# Patient Record
Sex: FEMALE | Race: WHITE | NOT HISPANIC OR LATINO | Employment: UNEMPLOYED | URBAN - METROPOLITAN AREA
[De-identification: names, ages, dates, MRNs, and addresses within clinical notes are randomized per-mention and may not be internally consistent; named-entity substitution may affect disease eponyms.]

---

## 2018-01-30 ENCOUNTER — OFFICE VISIT (OUTPATIENT)
Dept: OPHTHALMOLOGY | Facility: CLINIC | Age: 68
End: 2018-01-30
Payer: MEDICARE

## 2018-01-30 DIAGNOSIS — Z83.511 FAMILY HISTORY OF GLAUCOMA: ICD-10-CM

## 2018-01-30 DIAGNOSIS — H40.003 GLAUCOMA SUSPECT OF BOTH EYES: ICD-10-CM

## 2018-01-30 DIAGNOSIS — H57.13 PAIN OF BOTH EYES: ICD-10-CM

## 2018-01-30 DIAGNOSIS — H04.129 DRY EYE: Primary | ICD-10-CM

## 2018-01-30 PROBLEM — R76.8 HELICOBACTER PYLORI ANTIBODY POSITIVE: Status: ACTIVE | Noted: 2017-09-26

## 2018-01-30 PROCEDURE — 92014 COMPRE OPH EXAM EST PT 1/>: CPT | Mod: S$PBB,,, | Performed by: OPHTHALMOLOGY

## 2018-01-30 PROCEDURE — 99999 PR PBB SHADOW E&M-EST. PATIENT-LVL II: CPT | Mod: PBBFAC,,, | Performed by: OPHTHALMOLOGY

## 2018-01-30 PROCEDURE — 99212 OFFICE O/P EST SF 10 MIN: CPT | Mod: PBBFAC,PO | Performed by: OPHTHALMOLOGY

## 2018-01-30 RX ORDER — PREDNISOLONE ACETATE 10 MG/ML
1 SUSPENSION/ DROPS OPHTHALMIC 3 TIMES DAILY PRN
Qty: 5 ML | Refills: 0 | Status: SHIPPED | OUTPATIENT
Start: 2018-01-30 | End: 2018-03-06

## 2018-01-30 NOTE — PROGRESS NOTES
l    ===============================  01/30/2018   Maricruz Sawant,   67 y.o. female   Last visit [facility]: :8/15/2016   Last visit eye dept. Visit date not found  [facility]:  Corrected distance visual acuity was 20/20 in the right eye and 20/20 in the left eye.  Tonometry     Tonometry (Applanation, 2:42 PM)       Right Left    Pressure 14 14               Not recorded         Not recorded        Chief Complaint   Patient presents with    Dry Eye     yearly exam/  pt states that her left eye hurts off/on    Eye Pain os        HPI     Dry Eye    Additional comments: yearly exam/  pt states that her left eye hurts   off/on           Comments   PIgmentary Dispersion  VRTX  High Myope  H/O PVD OS  Dad with COAG  No DM  __________________________  12/29/2014  1. Dry eye, bilateral    2. Family history of glaucoma    3. Dry eye, stable  4. Lid fasciculation, ok, expect resolution, if continues and is   bothersome, see Dr. Birmingham  rtc 1 year       Last edited by Matilde Briscoe on 1/30/2018  2:35 PM. (History)          ________________  1/30/2018  Problem List Items Addressed This Visit     Dry eye - Primary    Eye pain    Family history of glaucoma      Other Visit Diagnoses     Glaucoma suspect of both eyes              .Patient deferred recommended dilation    Dry eye, rx for prednisolone, no refills for 1 year  Glaucoma suspect, + family history, asymmetric c:d  Good IOP  Gonio today angle wide open, 3+ pigment in angles  goct today OD rnfl borderline/normal, os low  Normal posterior pole  + family history    Recommend rtc for vf, cct, and Dr. Birmingham (ep with cpg)  Rule out pigmentary dispersion   See me prn    Patient request appt. With Dr. Adams         ===========================

## 2018-03-06 ENCOUNTER — OFFICE VISIT (OUTPATIENT)
Dept: OPHTHALMOLOGY | Facility: CLINIC | Age: 68
End: 2018-03-06
Payer: MEDICARE

## 2018-03-06 ENCOUNTER — APPOINTMENT (OUTPATIENT)
Dept: OPHTHALMOLOGY | Facility: CLINIC | Age: 68
End: 2018-03-06
Payer: MEDICARE

## 2018-03-06 DIAGNOSIS — H40.003 GLAUCOMA SUSPECT OF BOTH EYES: Primary | ICD-10-CM

## 2018-03-06 DIAGNOSIS — Z83.511 FAMILY HISTORY OF GLAUCOMA: ICD-10-CM

## 2018-03-06 DIAGNOSIS — H21.232 PIGMENT DISPERSION SYNDROME OF LEFT EYE: ICD-10-CM

## 2018-03-06 PROCEDURE — 76514 ECHO EXAM OF EYE THICKNESS: CPT | Mod: 26,S$PBB,, | Performed by: OPHTHALMOLOGY

## 2018-03-06 PROCEDURE — 92020 GONIOSCOPY: CPT | Mod: PBBFAC,PO | Performed by: OPHTHALMOLOGY

## 2018-03-06 PROCEDURE — 92020 GONIOSCOPY: CPT | Mod: S$PBB,,, | Performed by: OPHTHALMOLOGY

## 2018-03-06 PROCEDURE — 92012 INTRM OPH EXAM EST PATIENT: CPT | Mod: S$PBB,,, | Performed by: OPHTHALMOLOGY

## 2018-03-06 PROCEDURE — 99999 PR PBB SHADOW E&M-EST. PATIENT-LVL II: CPT | Mod: PBBFAC,,, | Performed by: OPHTHALMOLOGY

## 2018-03-06 PROCEDURE — 76514 ECHO EXAM OF EYE THICKNESS: CPT | Mod: PBBFAC,PO | Performed by: OPHTHALMOLOGY

## 2018-03-06 PROCEDURE — 99212 OFFICE O/P EST SF 10 MIN: CPT | Mod: PBBFAC,PO | Performed by: OPHTHALMOLOGY

## 2018-03-06 PROCEDURE — 92083 EXTENDED VISUAL FIELD XM: CPT | Mod: PBBFAC,PO | Performed by: OPHTHALMOLOGY

## 2018-03-06 NOTE — PROGRESS NOTES
HPI     Here for HVF review.  Referred by Dr. Hinkle to evaluate for pigmentary   glaucoma.  Wishes to defer dilation and any other tests she just had with   Dr. Hinkle.    PIgmentary Dispersion  VRTX  High Myope  H/O PVD OS  Dad with COAG  No DM  __________________________  12/29/2014  1. Dry eye, bilateral    2. Family history of glaucoma    3. Dry eye, stable  4. Lid fasciculation, ok, expect resolution, if continues and is   bothersome, see Dr. Birmingham  rtc 1 year    Last edited by Janee Peoples on 3/6/2018  3:37 PM. (History)            Assessment /Plan     For exam results, see Encounter Report.      ICD-10-CM ICD-9-CM    1. Glaucoma suspect of both eyes H40.003 365.00 Burden Visual Field - OU - Extended - Both Eyes    Glaucoma risk level is unchanged at this time and patient will continued to be followed.      2. Family history of glaucoma Z83.511 V19.11    3. Pigment dispersion syndrome of left eye H21.232 364.53 Follow closely.        Return to clinic 3 months with dilation and SDP's.

## 2019-06-07 ENCOUNTER — TELEPHONE (OUTPATIENT)
Dept: OPHTHALMOLOGY | Facility: CLINIC | Age: 69
End: 2019-06-07

## 2019-06-07 NOTE — TELEPHONE ENCOUNTER
----- Message from Desiree Scmhitt sent at 6/7/2019 12:14 PM CDT -----  Contact: pt  Type:  Patient Returning Call    Who Called: the pt  Who Left Message for Patient: unknown  Does the patient know what this is regarding?: no  Would the patient rather a call back or a response via [employer] [facility]? Call back  Best Call Back Number: [phone]  Additional Information: n/a

## 2019-06-07 NOTE — TELEPHONE ENCOUNTER
Ret call at 1:35, pt not there left mess with  that I returned call. He didn't know why she called.

## 2019-06-24 ENCOUNTER — OFFICE VISIT (OUTPATIENT)
Dept: OPHTHALMOLOGY | Facility: CLINIC | Age: 69
End: 2019-06-24
Payer: MEDICARE

## 2019-06-24 DIAGNOSIS — H04.129 DRY EYE: Primary | ICD-10-CM

## 2019-06-24 PROCEDURE — 99999 PR PBB SHADOW E&M-EST. PATIENT-LVL II: CPT | Mod: PBBFAC,,, | Performed by: OPHTHALMOLOGY

## 2019-06-24 PROCEDURE — 99999 PR PBB SHADOW E&M-EST. PATIENT-LVL II: ICD-10-PCS | Mod: PBBFAC,,, | Performed by: OPHTHALMOLOGY

## 2019-06-24 PROCEDURE — 99212 OFFICE O/P EST SF 10 MIN: CPT | Mod: PBBFAC | Performed by: OPHTHALMOLOGY

## 2019-06-24 PROCEDURE — 92014 PR EYE EXAM, EST PATIENT,COMPREHESV: ICD-10-PCS | Mod: S$PBB,,, | Performed by: OPHTHALMOLOGY

## 2019-06-24 PROCEDURE — 92014 COMPRE OPH EXAM EST PT 1/>: CPT | Mod: S$PBB,,, | Performed by: OPHTHALMOLOGY

## 2019-06-24 RX ORDER — PREDNISOLONE SODIUM PHOSPHATE 10 MG/ML
1 SOLUTION/ DROPS OPHTHALMIC 2 TIMES DAILY
Qty: 5 ML | Refills: 0 | Status: SHIPPED | OUTPATIENT
Start: 2019-06-24 | End: 2019-07-08

## 2019-06-24 NOTE — PROGRESS NOTES
===============================  06/24/2019   Maricruz Sawant,   68 y.o. female   Last visit [facility]: :1/30/2018   Last visit eye dept. Visit date not found  [facility]:  Corrected distance visual acuity was 20/25 -2 in the right eye and 20/30 +2 in the left eye.  Tonometry     Tonometry (Applanation, 2:59 PM)       Right Left    Pressure 17 17               Not recorded         Not recorded        Chief Complaint   Patient presents with    Eye Pain     pt states both eyes have been hurting for the past month. states the lids and and the eye itself hurt. the lids feel swollen. was using Systane that helped but it's not working anymore. also tried blink tears and another brand but did not get any relief.      Ophthalmic Medications     Ophthalmic - Anti-inflammatory, Glucocorticoids Start End     prednisoLONE sodium phosphate (INFLAMASE FORTE) 1 % Drop    6/24/2019 7/8/2019    Sig: Place 1 drop into both eyes 2 (two) times daily. for 14 days    Route: Both Eyes         HPI     Eye Pain      Additional comments: pt states both eyes have been hurting for the past   month. states the lids and and the eye itself hurt. the lids feel swollen.   was using Systane that helped but it's not working anymore. also tried   blink tears and another brand but did not get any relief.               Comments     PIgmentary Dispersion  VRTX  High Myope  H/O PVD OS  Dad with COAG  No DM  __________________________  12/29/2014  1. Dry eye, bilateral    2. Family history of glaucoma    3. Dry eye, stable  4. Lid fasciculation, ok, expect resolution, if continues and is   bothersome, see Dr. Birmingham  rtc 1 year          Last edited by Jose L Javier on 6/24/2019  2:20 PM. (History)          ________________  6/24/2019  Problem List Items Addressed This Visit     Dry eye - Primary    Relevant Medications    prednisoLONE sodium phosphate (INFLAMASE FORTE) 1 % Drop      lid swelling oswaldo in AM   I think this is rpeioculae vesous ccongestion    pf  bid x 2 weeks   call inb    .       ===========================

## 2019-07-31 ENCOUNTER — TELEPHONE (OUTPATIENT)
Dept: OPHTHALMOLOGY | Facility: CLINIC | Age: 69
End: 2019-07-31

## 2019-08-01 ENCOUNTER — TELEPHONE (OUTPATIENT)
Dept: OPHTHALMOLOGY | Facility: CLINIC | Age: 69
End: 2019-08-01

## 2019-08-01 DIAGNOSIS — H04.129 DRY EYE: Primary | ICD-10-CM

## 2019-08-01 RX ORDER — KETOROLAC TROMETHAMINE 5 MG/ML
1 SOLUTION OPHTHALMIC 2 TIMES DAILY PRN
Qty: 10 ML | Refills: 2 | Status: SHIPPED | OUTPATIENT
Start: 2019-08-01 | End: 2021-01-04

## 2019-08-01 NOTE — TELEPHONE ENCOUNTER
Maricruz called stating she tried the Prednisolone drops Dr. Hinkle prescribed to her but she did not see any improvement. She would like to know if another non steroidal drop will help and if so, can we send that to her pharmacy. She's still having swollen painful lids, dry eyes, no discharge. Dr. Hinkle reviewed her chart and suggested she try OTC Clear Eyes Pure Relief 3-4 times a day along with cold compresses. If she is not better after a couple weeks, she can call to come in to evaluate.     ----- Message from Ruiz Curiel sent at 8/1/2019  1:40 PM CDT -----  Contact: pt  Pt is calling regarding a change of medication. Pt is requesting to change to a new medication that's non-steroid instead of the medication that was given to pt.  Pt call back [phone]    [id number] - [address] - [address] [place] [address]  Phone: [phone] Fax: [phone]      Thanks

## 2019-08-01 NOTE — TELEPHONE ENCOUNTER
I spoke with Maricruz regarding adding the Clear eyes drops to her routine. She tried using them but did not get any relief. Dr. Hinkle suggests she use Acular twice a day and see if that will help. The prescription has been called in to the pharmacy and the patient notified.

## 2021-01-04 ENCOUNTER — OFFICE VISIT (OUTPATIENT)
Dept: OPHTHALMOLOGY | Facility: CLINIC | Age: 71
End: 2021-01-04
Payer: MEDICARE

## 2021-01-04 DIAGNOSIS — H26.012: Primary | ICD-10-CM

## 2021-01-04 PROCEDURE — 99999 PR PBB SHADOW E&M-EST. PATIENT-LVL II: CPT | Mod: PBBFAC,,, | Performed by: OPHTHALMOLOGY

## 2021-01-04 PROCEDURE — 92014 PR EYE EXAM, EST PATIENT,COMPREHESV: ICD-10-PCS | Mod: S$PBB,,, | Performed by: OPHTHALMOLOGY

## 2021-01-04 PROCEDURE — 99212 OFFICE O/P EST SF 10 MIN: CPT | Mod: PBBFAC | Performed by: OPHTHALMOLOGY

## 2021-01-04 PROCEDURE — 99999 PR PBB SHADOW E&M-EST. PATIENT-LVL II: ICD-10-PCS | Mod: PBBFAC,,, | Performed by: OPHTHALMOLOGY

## 2021-01-04 PROCEDURE — 92014 COMPRE OPH EXAM EST PT 1/>: CPT | Mod: S$PBB,,, | Performed by: OPHTHALMOLOGY

## 2021-02-23 ENCOUNTER — TELEPHONE (OUTPATIENT)
Dept: OPHTHALMOLOGY | Facility: CLINIC | Age: 71
End: 2021-02-23

## 2022-02-08 ENCOUNTER — OFFICE VISIT (OUTPATIENT)
Dept: OPHTHALMOLOGY | Facility: CLINIC | Age: 72
End: 2022-02-08
Payer: MEDICARE

## 2022-02-08 DIAGNOSIS — H26.011: ICD-10-CM

## 2022-02-08 DIAGNOSIS — Z83.511 FAMILY HISTORY OF GLAUCOMA: ICD-10-CM

## 2022-02-08 DIAGNOSIS — H40.023 OPEN ANGLE WITH BORDERLINE FINDINGS AND HIGH GLAUCOMA RISK IN BOTH EYES: Primary | ICD-10-CM

## 2022-02-08 DIAGNOSIS — H26.012: ICD-10-CM

## 2022-02-08 PROCEDURE — 99213 PR OFFICE/OUTPT VISIT, EST, LEVL III, 20-29 MIN: ICD-10-PCS | Mod: S$PBB,,, | Performed by: OPHTHALMOLOGY

## 2022-02-08 PROCEDURE — 92133 POSTERIOR SEGMENT OCT OPTIC NERVE(OCULAR COHERENCE TOMOGRAPHY) - OU - BOTH EYES: ICD-10-PCS | Mod: 26,S$PBB,, | Performed by: OPHTHALMOLOGY

## 2022-02-08 PROCEDURE — 99999 PR PBB SHADOW E&M-EST. PATIENT-LVL II: CPT | Mod: PBBFAC,,, | Performed by: OPHTHALMOLOGY

## 2022-02-08 PROCEDURE — 92133 CPTRZD OPH DX IMG PST SGM ON: CPT | Mod: PBBFAC | Performed by: OPHTHALMOLOGY

## 2022-02-08 PROCEDURE — 99213 OFFICE O/P EST LOW 20 MIN: CPT | Mod: S$PBB,,, | Performed by: OPHTHALMOLOGY

## 2022-02-08 PROCEDURE — 99999 PR PBB SHADOW E&M-EST. PATIENT-LVL II: ICD-10-PCS | Mod: PBBFAC,,, | Performed by: OPHTHALMOLOGY

## 2022-02-08 PROCEDURE — 99212 OFFICE O/P EST SF 10 MIN: CPT | Mod: PBBFAC | Performed by: OPHTHALMOLOGY

## 2022-02-08 RX ORDER — LATANOPROST 50 UG/ML
1 SOLUTION/ DROPS OPHTHALMIC
COMMUNITY
Start: 2022-01-14 | End: 2022-07-13

## 2022-02-08 NOTE — PROGRESS NOTES
===============================  Maricruz Sawant,  2/8/2022 today   71 y.o. female   Last visit [facility]: :1/4/2021   Last visit eye dept. Visit date not found  [facility]:  Corrected distance visual acuity was 20/30 in the right eye and 20/50 in the left eye.  Tonometry     Tonometry (Applanation, 2:40 PM)       Right Left    Pressure 12 12              Wearing Rx     Wearing Rx       Sphere Cylinder Axis    Right -5.50 +1.00 090    Left -7.25 +2.50 060    Type: SVL              Not recorded       Not recorded       Chief Complaint   Patient presents with    Dry Eye     Pt states that she is here today for a 2nd opinion.  She was recently told this but not quite sure.  She has been given drops to use but not using them yet.  Latanoprost     Ophthalmic Medications     Ophthalmic-Intraocular Pressure Reducing Agents, Prostaglandin Analogs Start End     latanoprost 0.005 % ophthalmic solution    1/14/2022 7/13/2022    Sig: Apply 1 drop to eye.    Class: Historical Med    Route: Ophthalmic        ________________  2/8/2022 today  HPI     Dry Eye      Additional comments: Pt states that she is here today for a 2nd opinion.    She was recently told this but not quite sure.  She has been given drops   to use but not using them yet.  Latanoprost              Comments     PIgmentary Dispersion  VRTX  High Myope  H/O PVD OS  Dad with COAG  No DM  __________________________  12/29/2014  1. Dry eye, bilateral    2. Family history of glaucoma    3. Dry eye, stable  4. Lid fasciculation, ok, expect resolution, if continues and is   bothersome, see Dr. Birmingham  rtc 1 year          Last edited by Matilde Briscoe on 2/8/2022  2:31 PM. 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 Check OU daily.   Gave my office and cell phone number.        ===========================

## 2022-04-27 ENCOUNTER — OFFICE VISIT (OUTPATIENT)
Dept: OPHTHALMOLOGY | Facility: CLINIC | Age: 72
End: 2022-04-27
Payer: MEDICARE

## 2022-04-27 DIAGNOSIS — H21.233 PIGMENT DISPERSION SYNDROME OF BOTH EYES: Primary | ICD-10-CM

## 2022-04-27 DIAGNOSIS — H26.012: ICD-10-CM

## 2022-04-27 DIAGNOSIS — H26.011: ICD-10-CM

## 2022-04-27 DIAGNOSIS — Z83.511 FAMILY HISTORY OF GLAUCOMA: ICD-10-CM

## 2022-04-27 DIAGNOSIS — H40.023 OPEN ANGLE WITH BORDERLINE FINDINGS AND HIGH GLAUCOMA RISK IN BOTH EYES: ICD-10-CM

## 2022-04-27 PROCEDURE — 99214 OFFICE O/P EST MOD 30 MIN: CPT | Mod: S$PBB,,, | Performed by: OPHTHALMOLOGY

## 2022-04-27 PROCEDURE — 92083 EXTENDED VISUAL FIELD XM: CPT | Mod: PBBFAC | Performed by: OPHTHALMOLOGY

## 2022-04-27 PROCEDURE — 99211 OFF/OP EST MAY X REQ PHY/QHP: CPT | Mod: PBBFAC | Performed by: OPHTHALMOLOGY

## 2022-04-27 PROCEDURE — 99214 PR OFFICE/OUTPT VISIT, EST, LEVL IV, 30-39 MIN: ICD-10-PCS | Mod: S$PBB,,, | Performed by: OPHTHALMOLOGY

## 2022-04-27 PROCEDURE — 99999 PR PBB SHADOW E&M-EST. PATIENT-LVL I: ICD-10-PCS | Mod: PBBFAC,,, | Performed by: OPHTHALMOLOGY

## 2022-04-27 PROCEDURE — 92083 HUMPHREY VISUAL FIELD - OU - BOTH EYES: ICD-10-PCS | Mod: 26,S$PBB,, | Performed by: OPHTHALMOLOGY

## 2022-04-27 PROCEDURE — 99999 PR PBB SHADOW E&M-EST. PATIENT-LVL I: CPT | Mod: PBBFAC,,, | Performed by: OPHTHALMOLOGY

## 2022-04-27 RX ORDER — DORZOLAMIDE HYDROCHLORIDE AND TIMOLOL MALEATE 20; 5 MG/ML; MG/ML
1 SOLUTION/ DROPS OPHTHALMIC 2 TIMES DAILY
Qty: 10 ML | Refills: 1 | Status: SHIPPED | OUTPATIENT
Start: 2022-04-27 | End: 2023-04-27

## 2022-04-27 NOTE — PROGRESS NOTES
HPI     Glaucoma Suspect     Comments: Pt reports for glaucoma eval. Referred by Dr. Hinkle. Last saw   MGM in 2018. Noticed some pain off and on in and behind eyes. Reported a   decrease in vision. Not currently using any drops at this time.               Comments     C/c pt took latanoprost in the past and she stopped it after reading   insert- says she saw the reactions to prostaglandins and wanted to stop   treatments     Pigmentary Dispersion  VRTX  High Myope  H/O PVD OS  Dad with COAG  No DM  __________________________  12/29/2014  1. Dry eye, bilateral    2. Family history of glaucoma    3. Dry eye, stable  4. Lid fasciculation, ok, expect resolution, if continues and is   bothersome, see Dr. Birmingham  rtc 1 year            Last edited by Farhad Adams MD on 4/27/2022  3:42 PM. (History)            Assessment /Plan     For exam results, see Encounter Report.      ICD-10-CM ICD-9-CM    1. Pigment dispersion syndrome of both eyes  H21.233 364.53 IOP not within acceptable range relative to target IOP with risk of irreversible visual loss. Additional treatment required.  Discussed options, risks, and benefits of additional medication, SLT laser, or incisional glaucoma surgery.     recommend medication     Patient chooses to stop Latanoprost and trial of Cosopt BID OU     Reviewed importance of continued compliance with treatment and follow up.      2. Open angle with borderline findings and high glaucoma risk in both eyes  H40.023 365.05 Burden Visual Field - OU - Extended - Both Eyes   3. Family history of glaucoma  Z83.511 V19.11    4. Lamellar cataract, left  H26.012 366.03 Pt desires to follow at this time    5. Lamellar cataract, right  H26.011 366.03 Pt desires to follow at this time      Start trial of new meds Cosopt bid Ou    Pt is reluctant with treatment     RETURN TO CLINIC 2  Month IOP by [facility] then DOA              yes

## 2022-05-11 ENCOUNTER — TELEPHONE (OUTPATIENT)
Dept: OPHTHALMOLOGY | Facility: CLINIC | Age: 72
End: 2022-05-11
Payer: MEDICARE

## 2022-05-11 NOTE — TELEPHONE ENCOUNTER
[facility] received letter in the mail from pt requesting electronic copies of previous HVF and GOCT. Informed pt I cannot send electronic copies, but we can send hard copies to her home address.     Pt not scheduled for 2 month follow up with MGM as recommended, offered to schedule appt with MGM while on the phone with patient, she defers any further scheduling at this time.

## 2022-06-13 ENCOUNTER — OFFICE VISIT (OUTPATIENT)
Dept: OPHTHALMOLOGY | Facility: CLINIC | Age: 72
End: 2022-06-13
Payer: MEDICARE

## 2022-06-13 DIAGNOSIS — H40.023 OPEN ANGLE WITH BORDERLINE FINDINGS AND HIGH GLAUCOMA RISK IN BOTH EYES: Primary | ICD-10-CM

## 2022-06-13 DIAGNOSIS — G44.89 HEADACHE SYNDROME: ICD-10-CM

## 2022-06-13 DIAGNOSIS — H26.9 TRACE CATARACTS: ICD-10-CM

## 2022-06-13 DIAGNOSIS — Z83.511 FAMILY HISTORY OF GLAUCOMA: ICD-10-CM

## 2022-06-13 PROCEDURE — 99999 PR PBB SHADOW E&M-EST. PATIENT-LVL II: ICD-10-PCS | Mod: PBBFAC,,, | Performed by: OPHTHALMOLOGY

## 2022-06-13 PROCEDURE — 99212 OFFICE O/P EST SF 10 MIN: CPT | Mod: PBBFAC | Performed by: OPHTHALMOLOGY

## 2022-06-13 PROCEDURE — 92012 INTRM OPH EXAM EST PATIENT: CPT | Mod: S$PBB,,, | Performed by: OPHTHALMOLOGY

## 2022-06-13 PROCEDURE — 99999 PR PBB SHADOW E&M-EST. PATIENT-LVL II: CPT | Mod: PBBFAC,,, | Performed by: OPHTHALMOLOGY

## 2022-06-13 PROCEDURE — 92012 PR EYE EXAM, EST PATIENT,INTERMED: ICD-10-PCS | Mod: S$PBB,,, | Performed by: OPHTHALMOLOGY

## 2022-06-13 NOTE — PROGRESS NOTES
===============================  Date today is 6/13/2022  Maricruz Sawant is a 71 y.o. female  Last visit [facility]: :2/8/2022   Last visit eye dept. 4/27/2022    Visual acuity was not recorded.  Tonometry     Tonometry (Applanation, 3:21 PM)       Right Left    Pressure 16 16              Not recorded       Not recorded       Not recorded       Chief Complaint   Patient presents with    2nd opinion     Pt states she is here to talk to dr madison about her last visit with Dr ghosh       Problem List Items Addressed This Visit     Family history of glaucoma      Other Visit Diagnoses     Open angle with borderline findings and high glaucoma risk in both eyes    -  Primary    Headache syndrome        Trace cataracts              ________________  6/13/2022 today    She has had chronic intractable headache-not related to glaucoma, need neurology eval   [facility]    her headaches have been worked up, per pt -inconclusive    Mgm agree w dx of  Glaucoma    [facility] oph dx pigmentary glaucoma    Brings in a vf that has poor indicators of accuracy  Need to repeat vf - discussed thresh holding  rnfl looks great  Anterior segment looks fine, no cell, deep    Gonio ok with me\  My recommendation is for her to see a glaucoma specialist  - perhaps Dr. Caro     iop 2/year her  Dr harrell 2/year  No drops for now  IOP ok today at 16 OU    RTC for repeat 24-2 VF review and schedule with Dr. Harrell for glaucoma eval  Instructed to call 24/7 for any worsening of vision or symptoms. Check OU daily.   Gave my office and cell phone number.    .      ===============================

## 2022-06-16 ENCOUNTER — TELEPHONE (OUTPATIENT)
Dept: OPHTHALMOLOGY | Facility: CLINIC | Age: 72
End: 2022-06-16
Payer: MEDICARE

## 2022-06-16 NOTE — TELEPHONE ENCOUNTER
Left message on voicemail on 6/14 and 6/16 about scheduling a 2nd opinion for pigmentary glaucoma with Dr. Harrell.    ----- Message from Swati Salinas MA sent at 6/14/2022  4:18 PM CDT -----  Regarding: FW: 2nd opinion pigmentary glaucoma  Can you find a place for this pt?     ----- Message -----  From: Padmini Jacome MA  Sent: 6/13/2022   4:41 PM CDT  To: Swati Salinas MA  Subject: FW: 2nd opinion pigmentary glaucoma                ----- Message -----  From: Jose L Javier MA  Sent: 6/13/2022   4:00 PM CDT  To: Julio Cesar [facility] Staff  Subject: 2nd opinion pigmentary glaucoma                  Good afternoon,   Dr. Hinkle would like Dr. Harrell to see Mrs. Sawant for a second opinion for pigmentary glaucoma.   She's received conflicting answers from other doctors regarding her glaucoma diagnosis and requested another opinion. She will repeat her 24-2 VF within the next month, she did not feel her last test was accurate. Can you please contact patient to schedule an evaluation? Thanks.

## 2023-03-13 ENCOUNTER — TELEPHONE (OUTPATIENT)
Dept: OPHTHALMOLOGY | Facility: CLINIC | Age: 73
End: 2023-03-13
Payer: MEDICARE

## 2023-03-13 NOTE — TELEPHONE ENCOUNTER
----- Message from Speedy An sent at 3/13/2023 12:29 PM CDT -----  Contact: [phone]  Pt states that she is having a lot of pain in the outer angle of her left eye.  Pt states that she is having vision issues with it as well. Please call back to further assist.

## 2023-07-24 ENCOUNTER — TELEPHONE (OUTPATIENT)
Dept: OPHTHALMOLOGY | Facility: CLINIC | Age: 73
End: 2023-07-24
Payer: MEDICARE

## 2023-07-24 NOTE — TELEPHONE ENCOUNTER
Left message on Voicemail- rescheduled appointment with Dr. Harrell and mailed out slip.    ----- Message from Leatha Acosta sent at 7/24/2023  2:21 PM CDT -----  Regarding: Scheduling Request  Pt will not be available on 07/31. She wants to reschedule her appt for a date in October. Please schedule the appt and leave a voicemail with the details.      Maricruz @ [phone]

## 2024-03-18 ENCOUNTER — OFFICE VISIT (OUTPATIENT)
Dept: OPHTHALMOLOGY | Facility: CLINIC | Age: 74
End: 2024-03-18
Payer: MEDICARE

## 2024-03-18 DIAGNOSIS — Z83.511 FAMILY HISTORY OF GLAUCOMA: ICD-10-CM

## 2024-03-18 DIAGNOSIS — H40.023 OPEN ANGLE WITH BORDERLINE FINDINGS AND HIGH GLAUCOMA RISK IN BOTH EYES: Primary | ICD-10-CM

## 2024-03-18 DIAGNOSIS — G44.89 HEADACHE SYNDROME: ICD-10-CM

## 2024-03-18 DIAGNOSIS — H43.813 PVD (POSTERIOR VITREOUS DETACHMENT), BOTH EYES: ICD-10-CM

## 2024-03-18 PROCEDURE — 92134 CPTRZ OPH DX IMG PST SGM RTA: CPT | Mod: PBBFAC | Performed by: OPHTHALMOLOGY

## 2024-03-18 PROCEDURE — 99212 OFFICE O/P EST SF 10 MIN: CPT | Mod: PBBFAC,25 | Performed by: OPHTHALMOLOGY

## 2024-03-18 PROCEDURE — 99214 OFFICE O/P EST MOD 30 MIN: CPT | Mod: S$PBB,,, | Performed by: OPHTHALMOLOGY

## 2024-03-18 PROCEDURE — 99999 PR PBB SHADOW E&M-EST. PATIENT-LVL II: CPT | Mod: PBBFAC,,, | Performed by: OPHTHALMOLOGY

## 2024-03-18 NOTE — PROGRESS NOTES
===============================  Date today is 3/18/2024  Maricruz Sawant is a 73 y.o. female  Last visit [facility]: :6/13/2022   Last visit eye dept. Visit date not found    Corrected distance visual acuity was 20/60 in the right eye and 20/70 in the left eye.  Tonometry       Tonometry (Applanation, 3:07 PM)         Right Left    Pressure 12 12                  Wearing Rx       Wearing Rx         Sphere Cylinder Axis    Right -5.75 +1.00 110    Left -7.25 +2.50 055      Type: SVL              Wearing Rx #2         Sphere Cylinder Axis    Right -5.50 +1.00 090    Left -7.25 +2.50 060      Type: SVL                  Manifest Refraction       Manifest Refraction         Sphere Cylinder [place] [facility]    Right -6.00 +1.00 115 20/50    Left -8.25 +2.50 055 20/50                  Not recorded       Chief Complaint   Patient presents with    Eye Pain     Urgent care visit today/  Pt states that her left eye has been painful and cloudy vision for about 10 days now. She is using moisture drops     HPI     Eye Pain     Additional comments: Urgent care visit today/  Pt states that her left   eye has been painful and cloudy vision for about 10 days now. She is using   moisture drops           Comments    Pigmentary Dispersion  VRTX  High Myope  H/O PVD OS  Dad with COAG  No DM  __________________________  12/29/2014  1. Dry eye, bilateral    2. Family history of glaucoma    3. Dry eye, stable  4. Lid fasciculation, ok, expect resolution, if continues and is   bothersome, see Dr. Valencia García 30/27---- 4-2022   Pt states that she is not using her glaucoma drops          Last edited by Matilde Briscoe on 3/18/2024  2:17 PM.      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  Check OU independently daily.    Gave my office and personal cell phone number.  ________________  3/18/2024 today  Maricruz Sawant      2+ ns od    3+ ns os    t12 12 off drops      :  Good afternoon,   Dr. Hinkle would like Dr. Harrell to see Mrs. Sawant for a second opinion for pigmentary glaucoma.   She's received conflicting answers from other doctors regarding her glaucoma diagnosis and requested another opinion. She will repeat her 24-2 VF within the next month, she did not feel her last test was accurate. Can you please contact patient to schedule an evaluation? Thanks.      [facility]gm rec glaucoma tx   Chronic headache  PVD OU with medina ring  No holes or tears on exam    RTC with Dr. Adams for cataract eval and lost to f/u glaucoma  Instructed to call 24/7 for any worsening of vision or symptoms. Check OU daily.   Gave my office and cell phone number.    ============================

## 2024-03-22 ENCOUNTER — TELEPHONE (OUTPATIENT)
Dept: OPHTHALMOLOGY | Facility: CLINIC | Age: 74
End: 2024-03-22
Payer: MEDICARE

## 2024-03-22 NOTE — TELEPHONE ENCOUNTER
Returned call, no answer    ----- Message from Ashley Schmitt sent at 3/22/2024 10:46 AM CDT -----  Type:  Patient Returning Call     Who Called:Maricruz   Who Left Message for Patient:nurse Sun  Does the patient know what this is regarding?:yes   Would the patient rather a call back or a response via [facility]? Call back   Best Call Back Number:.[phone]   Additional Information: na

## 2024-03-22 NOTE — TELEPHONE ENCOUNTER
Called to schedule cat/coag eval  No answer, scheduled in [facility] next available   Will mail letter to pts home

## 2024-04-02 ENCOUNTER — TELEPHONE (OUTPATIENT)
Dept: OPHTHALMOLOGY | Facility: CLINIC | Age: 74
End: 2024-04-02
Payer: MEDICARE

## 2024-04-02 NOTE — TELEPHONE ENCOUNTER
Returned call to r/s cat eval, LVM    ----- Message from Dino Campbell sent at 4/1/2024  1:02 PM CDT -----  Contact: fina    ----- Message -----  From: Deysi Luis  Sent: 4/1/2024  12:06 PM CDT  To: Bryan Alcantara is calling regarding her appointment being scheduled in the afternoon.  Please give her a call back at [phone]

## 2024-04-04 ENCOUNTER — TELEPHONE (OUTPATIENT)
Dept: OPHTHALMOLOGY | Facility: CLINIC | Age: 74
End: 2024-04-04
Payer: MEDICARE

## 2024-04-04 NOTE — TELEPHONE ENCOUNTER
Returned call to r/s appt, LAVELLE. Mgm will not have an opening in the afternoon for cat eval until 6/12/24 at 1:00. Did not move pt, unsure if she would want to wait that long.   Asked she call back to discuss    ----- Message from Dino Campbell sent at 4/4/2024 11:41 AM CDT -----    ----- Message -----  From: Dino Campbell  Sent: 4/3/2024  10:50 AM CDT  To: Dino Campbell      ----- Message -----  From: Ashley Schmitt  Sent: 4/3/2024  10:50 AM CDT  To: Bryan Alcantara is calling regarding her appointment being scheduled in the afternoon cataract eval returning Mason call .  Please give her a call back at [phone]

## 2024-11-11 ENCOUNTER — TELEPHONE (OUTPATIENT)
Dept: OPHTHALMOLOGY | Facility: CLINIC | Age: 74
End: 2024-11-11
Payer: MEDICARE

## 2024-11-11 NOTE — TELEPHONE ENCOUNTER
Returned call to r/s vintLAVELLE    ----- Message from Lesly Sun sent at 11/8/2024  2:50 PM CST -----  Cat eval

## 2024-11-13 ENCOUNTER — TELEPHONE (OUTPATIENT)
Dept: OPHTHALMOLOGY | Facility: CLINIC | Age: 74
End: 2024-11-13
Payer: MEDICARE

## 2024-11-13 NOTE — TELEPHONE ENCOUNTER
Called again to r/s cat eval, no answer  Scheduled in [facility] [place], will send letter to pts home     ----- Message from Lesly Sun sent at 11/13/2024  2:48 PM CST -----    ----- Message -----  From: Ashley Schmitt  Sent: 11/13/2024   2:39 PM CST  To: Bryan SOLORIO Staff     [phone]   Patient is returning a phone call Dino to schedule cataract eval.

## 2024-12-09 ENCOUNTER — TELEPHONE (OUTPATIENT)
Dept: OPHTHALMOLOGY | Facility: CLINIC | Age: 74
End: 2024-12-09
Payer: MEDICARE

## 2024-12-09 NOTE — TELEPHONE ENCOUNTER
Returned call, [facility]    ----- Message from Ashley sent at 12/9/2024  2:51 PM CST -----  [phone] (home)    pt is asking for an return call in reference to questions she has about her scheduled  cataract cons

## 2024-12-09 NOTE — TELEPHONE ENCOUNTER
Returned call, no answer x2 LVM     ----- Message from Ashley sent at 12/9/2024  3:38 PM CST -----  , [phone]   Calling to reschedule her appointment with Dr Adams tomorrow. Cataract cons  Please call her. Thanks.

## 2024-12-10 ENCOUNTER — OFFICE VISIT (OUTPATIENT)
Dept: OPHTHALMOLOGY | Facility: CLINIC | Age: 74
End: 2024-12-10
Payer: MEDICARE

## 2024-12-10 DIAGNOSIS — H25.13 NUCLEAR SCLEROTIC CATARACT OF BOTH EYES: ICD-10-CM

## 2024-12-10 DIAGNOSIS — H40.1322 PIGMENTARY GLAUCOMA OF LEFT EYE, MODERATE STAGE: Primary | ICD-10-CM

## 2024-12-10 DIAGNOSIS — H01.026 SQUAMOUS BLEPHARITIS OF BOTH EYES, UNSPECIFIED EYELID: ICD-10-CM

## 2024-12-10 DIAGNOSIS — H01.023 SQUAMOUS BLEPHARITIS OF BOTH EYES, UNSPECIFIED EYELID: ICD-10-CM

## 2024-12-10 DIAGNOSIS — H40.021 OPEN ANGLE WITH BORDERLINE FINDINGS AND HIGH GLAUCOMA RISK IN RIGHT EYE: ICD-10-CM

## 2024-12-10 DIAGNOSIS — H04.123 DRY EYE SYNDROME, BILATERAL: ICD-10-CM

## 2024-12-10 PROCEDURE — 99999 PR PBB SHADOW E&M-EST. PATIENT-LVL II: CPT | Mod: PBBFAC,,, | Performed by: OPHTHALMOLOGY

## 2024-12-10 PROCEDURE — 92133 CPTRZD OPH DX IMG PST SGM ON: CPT | Mod: PBBFAC | Performed by: OPHTHALMOLOGY

## 2024-12-10 PROCEDURE — G2211 COMPLEX E/M VISIT ADD ON: HCPCS | Mod: S$PBB,,, | Performed by: OPHTHALMOLOGY

## 2024-12-10 PROCEDURE — 99214 OFFICE O/P EST MOD 30 MIN: CPT | Mod: S$PBB,,, | Performed by: OPHTHALMOLOGY

## 2024-12-10 PROCEDURE — 99212 OFFICE O/P EST SF 10 MIN: CPT | Mod: PBBFAC | Performed by: OPHTHALMOLOGY

## 2024-12-10 RX ORDER — TIMOLOL MALEATE 5 MG/ML
1 SOLUTION/ DROPS OPHTHALMIC 2 TIMES DAILY
Qty: 15 ML | Refills: 1 | Status: SHIPPED | OUTPATIENT
Start: 2024-12-10 | End: 2025-12-10

## 2024-12-10 RX ORDER — LIFITEGRAST 50 MG/ML
1 SOLUTION/ DROPS OPHTHALMIC
COMMUNITY
Start: 2024-11-27

## 2024-12-10 NOTE — PROGRESS NOTES
"HPI     Dry Eye            Comments: Patient reports for eye exam. Reports with significant dryness.   Using Ivizia, states it does not help. Also reports with diagnosis of   pigmentary glaucoma. No current drops being used for that diagnosis. Was   told her eye pressure was "under control" and that she does not need   drops. Pt states she tried drops in the past and had trouble with meds -   tried Xiidra in the past and she stopped using it    Also reports with cataracts.  Pt is upset with scheduling and spoke to  today regarding her   com         Last edited by Farhad Adams MD on 12/10/2024  9:45 AM.            Assessment /Plan     For exam results, see Encounter Report.      ICD-10-CM ICD-9-CM    1. Pigmentary glaucoma of left eye, moderate stage  H40.1322 365.13 Posterior Segment OCT Optic Nerve- Both eyes    VIKY diagnosis with patient  GCL/OCT OS shows progression from previous scans  Discussed treatment options, drops vs SLT   Pt desires to resume Timolol BID OS    Visit today included increased complexity associated with the care and management of glaucoma. Patient aware that management of medical condition requires long term follow up.  Written instructions were placed in the portal for the patient upon closure of the encounter regarding specific use of the required medications.      **Concave iris, could consider LPI in the future     365.72       2. Open angle with borderline findings and high glaucoma risk in right eye  H40.021 365.05 Glaucoma risk level is unchanged at this time and patient will continued to be followed.       3. Dry eye syndrome, bilateral  H04.123 375.15 Pt symptomatic  Pt does not desire to use Xiidra or Restasis   Discussed gel qpm, also serum tears  Continue Ivizia       4. Nuclear sclerotic cataract of both eyes  H25.13 366.16 Pt defers to schedule at this time       5. Squamous blepharitis of both eyes, unspecified eyelid  H01.023 373.02 Monitor     H01.026      "       Return to clinic 6-8 weeks for HVF 24-2, IOP and dry eye check        Timolol BID OS

## 2025-01-31 ENCOUNTER — TELEPHONE (OUTPATIENT)
Dept: OPHTHALMOLOGY | Facility: CLINIC | Age: 75
End: 2025-01-31
Payer: MEDICARE

## 2025-02-20 ENCOUNTER — OFFICE VISIT (OUTPATIENT)
Dept: OPHTHALMOLOGY | Facility: CLINIC | Age: 75
End: 2025-02-20
Payer: MEDICARE

## 2025-02-20 DIAGNOSIS — H40.1322 PIGMENTARY GLAUCOMA OF LEFT EYE, MODERATE STAGE: Primary | ICD-10-CM

## 2025-02-20 DIAGNOSIS — H25.13 NUCLEAR SCLEROTIC CATARACT OF BOTH EYES: ICD-10-CM

## 2025-02-20 DIAGNOSIS — H40.021 OPEN ANGLE WITH BORDERLINE FINDINGS AND HIGH GLAUCOMA RISK IN RIGHT EYE: ICD-10-CM

## 2025-02-20 DIAGNOSIS — H04.123 DRY EYE SYNDROME, BILATERAL: ICD-10-CM

## 2025-02-20 PROCEDURE — 92083 EXTENDED VISUAL FIELD XM: CPT | Mod: PBBFAC | Performed by: OPHTHALMOLOGY

## 2025-02-20 PROCEDURE — 99212 OFFICE O/P EST SF 10 MIN: CPT | Mod: PBBFAC | Performed by: OPHTHALMOLOGY

## 2025-02-20 NOTE — PROGRESS NOTES
HPI     Glaucoma            Comments: Pt reports for 6-8wk IOP check. Denies any pain but OS is   irritated more than OD. Va stable. No drops.           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 Dry eye, bilateral    2. Family history of glaucoma    3. Dry eye Not controlled  4. Lid fasciculation, ok, expect resolution, if continues and is   bothersome, see CPG  5. Pigmentary glaucoma OS   6. KCS OU  7. Cataracts OU             Last edited by Farhad Wells on 2/20/2025  3:30 PM.            Assessment /Plan     For exam results, see Encounter Report.      ICD-10-CM ICD-9-CM    1. Pigmentary glaucoma of left eye, moderate stage  H40.1322 365.13 Burden Visual Field - OU - Extended - Both Eyes    HVF OU unremarkable   IOP OU higher off Timolol   Pt not currently on any drop treatment  States Timolol irritated eyes with use  VIKY drop treatment vs SLT   Pt defers additional treatment at this time, will further discuss options at next visit     IOP not within acceptable range relative to target IOP with risk of irreversible visual loss. Additional treatment required.  Discussed options, risks, and benefits of additional medication, SLT laser, or incisional glaucoma surgery.     Recommend: SLT OU    Patient chooses to monitor at this time off treatment  Understands risks of not proceeding with further treatment at this time    Reviewed importance of continued compliance with treatment and follow up.     Visit today included increased complexity associated with the care and management of glaucoma, cataract, and dry eye . Patient aware that management of medical condition requires long term follow up.  Written instructions were placed in the portal for the patient upon closure of the encounter regarding specific use of the required  medications.     365.72       2. 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 Dry eye syndrome, bilateral  H04.123 375.15 Advanced on exam  VIKY treatment options, ie: serum tears, nordic naturals, xiidra, gel qpm  Pt desires to start gel qpm and ivizia       4. Nuclear sclerotic cataract of both eyes  H25.13 366.16 VIKY cataracts are ready for removal  Would not recommend dispensing new MR at this time  Will likely schedule SLT prior to CE  Would be advised to have dryness under control before proceeding with CE